# Patient Record
Sex: MALE | Race: BLACK OR AFRICAN AMERICAN | NOT HISPANIC OR LATINO | URBAN - METROPOLITAN AREA
[De-identification: names, ages, dates, MRNs, and addresses within clinical notes are randomized per-mention and may not be internally consistent; named-entity substitution may affect disease eponyms.]

---

## 2018-12-28 ENCOUNTER — EMERGENCY (EMERGENCY)
Facility: HOSPITAL | Age: 23
LOS: 1 days | Discharge: HOME | End: 2018-12-28
Admitting: EMERGENCY MEDICINE
Payer: MEDICAID

## 2018-12-28 VITALS
HEART RATE: 87 BPM | RESPIRATION RATE: 18 BRPM | DIASTOLIC BLOOD PRESSURE: 71 MMHG | TEMPERATURE: 98 F | OXYGEN SATURATION: 98 % | SYSTOLIC BLOOD PRESSURE: 105 MMHG

## 2018-12-28 DIAGNOSIS — F12.129 CANNABIS ABUSE WITH INTOXICATION, UNSPECIFIED: ICD-10-CM

## 2018-12-28 DIAGNOSIS — R41.82 ALTERED MENTAL STATUS, UNSPECIFIED: ICD-10-CM

## 2018-12-28 PROCEDURE — 99284 EMERGENCY DEPT VISIT MOD MDM: CPT

## 2018-12-28 NOTE — ED PROVIDER NOTE - NSFOLLOWUPINSTRUCTIONS_ED_ALL_ED_FT
AVOID EXCESSIVE DRUG USE AND ALCOHOL CONSUMPTION.   STAY WELL HYDRATED.   RETURN TO ER FOR ANY NEW OR CONCERNING SYMPTOMS OTHERWISE FOLLOW UP WITH YOUR PMD IN 2-3 DAYS.   DON'T FORGET TO GET YOUR SUTURES REMOVED 7 DAYS AFTER THEY WERE ORIGINALLY PLACED.

## 2018-12-28 NOTE — ED PROVIDER NOTE - OBJECTIVE STATEMENT
24 y/o Male presents to the ED for AMS. Pt admits to smoking K2 but denies any other drug use or alcohol. Pt has 3 stiches to the left eyebrow from 2 days ago. No acute trauma, no obvious signs of trauma, and pt is not complaining of any other symptoms.

## 2018-12-28 NOTE — ED PROVIDER NOTE - MEDICAL DECISION MAKING DETAILS
Pt intoxicated after smoking K2, will wait until pt is sober with stable gait before safely DC. Pt intoxicated after smoking K2, will wait until pt is sober with stable gait before safely DC.    pt clinically sober - ambulating with steady gait requesting discharge.

## 2018-12-28 NOTE — ED PROVIDER NOTE - SKIN, MLM
Well healing wound with 3 stitches to the left eyebrow with no signs of infection. No purulent drainage.

## 2021-01-05 NOTE — ED ADULT NURSE NOTE - EENT ASSESSMENT, MLM
WDL Surgeon/Pathologist Verbiage (Will Incorporate Name Of Surgeon From Intro If Not Blank): operated in two distinct and integrated capacities as the surgeon and pathologist.